# Patient Record
Sex: FEMALE | Race: WHITE | Employment: UNEMPLOYED | URBAN - METROPOLITAN AREA
[De-identification: names, ages, dates, MRNs, and addresses within clinical notes are randomized per-mention and may not be internally consistent; named-entity substitution may affect disease eponyms.]

---
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
## 2023-02-21 ENCOUNTER — OFFICE VISIT (OUTPATIENT)
Dept: FAMILY MEDICINE CLINIC | Facility: CLINIC | Age: 18
End: 2023-02-21
Payer: COMMERCIAL

## 2023-02-21 VITALS
HEIGHT: 64 IN | BODY MASS INDEX: 21.92 KG/M2 | SYSTOLIC BLOOD PRESSURE: 98 MMHG | WEIGHT: 128.4 LBS | DIASTOLIC BLOOD PRESSURE: 68 MMHG

## 2023-02-21 DIAGNOSIS — J01.90 ACUTE SINUSITIS, RECURRENCE NOT SPECIFIED, UNSPECIFIED LOCATION: ICD-10-CM

## 2023-02-21 DIAGNOSIS — J30.9 ALLERGIC RHINITIS, UNSPECIFIED SEASONALITY, UNSPECIFIED TRIGGER: ICD-10-CM

## 2023-02-21 DIAGNOSIS — R51.9 FACIAL PAIN: ICD-10-CM

## 2023-02-21 DIAGNOSIS — R09.81 HEAD CONGESTION: Primary | ICD-10-CM

## 2023-02-21 PROCEDURE — 99213 OFFICE O/P EST LOW 20 MIN: CPT | Performed by: FAMILY MEDICINE

## 2023-02-21 RX ORDER — FLUOXETINE HYDROCHLORIDE 20 MG/1
CAPSULE ORAL
COMMUNITY
Start: 2023-01-27

## 2023-02-21 RX ORDER — FLUOXETINE 10 MG/1
CAPSULE ORAL
COMMUNITY
Start: 2023-01-27

## 2023-02-21 RX ORDER — AZITHROMYCIN 250 MG/1
TABLET, FILM COATED ORAL
Qty: 6 TABLET | Refills: 0 | Status: SHIPPED | OUTPATIENT
Start: 2023-02-21

## 2023-02-21 ASSESSMENT — PATIENT HEALTH QUESTIONNAIRE - PHQ9
1. LITTLE INTEREST OR PLEASURE IN DOING THINGS: 0
SUM OF ALL RESPONSES TO PHQ QUESTIONS 1-9: 0
2. FEELING DOWN, DEPRESSED OR HOPELESS: 0
SUM OF ALL RESPONSES TO PHQ9 QUESTIONS 1 & 2: 0

## 2023-02-21 NOTE — PROGRESS NOTES
SUBJECTIVE:   Annamarie Crouch is a 21 y.o. female who has a PMH sig for anxious depression followed by psychiatry, allergic rhinitis, tachycardia with negative pediatric cardiological work-up and scoliosis. Patient presents today reporting a roughly 2-week history of worsening facial pain, yellow-green nasal drainage, headache and congestion in her head. Allergies have been flared up for about 3 weeks. Over-the-counter meds have been of little benefit. No fever. Negative COVID test at home. HPI  See above    Past Medical History, Past Surgical History, Family history, Social History, and Medications were all reviewed with the patient today and updated as necessary. Current Outpatient Medications   Medication Sig Dispense Refill    FLUoxetine (PROZAC) 20 MG capsule TAKE 1 CAPSULE BY MOUTH DAILY. TAKE WITH FLUOXETINE 10MG CAPSULE FOR A TOTAL OF 30MG DAILY      FLUoxetine (PROZAC) 10 MG capsule TAKE 1 CAPSULE BY MOUTH DAILY. TAKE WITH FLUOXETINE 20MG CAPSULE FOR A TOTAL OF 30MG DAILY      buPROPion (WELLBUTRIN XL) 150 MG extended release tablet Take 150 mg by mouth daily      norethindrone-ethinyl estradiol (JUNEL FE 1/20) 1-20 MG-MCG per tablet Take 1 tablet by mouth daily      albuterol sulfate  (90 Base) MCG/ACT inhaler Inhale into the lungs (Patient not taking: Reported on 2/21/2023)      doxycycline monohydrate (MONODOX) 100 MG capsule Take 100 mg by mouth 2 times daily (Patient not taking: Reported on 2/21/2023)      fexofenadine (ALLEGRA) 180 MG tablet 1 tablet (Patient not taking: Reported on 2/21/2023)       No current facility-administered medications for this visit. Allergies   Allergen Reactions    Cefdinir Hives and Rash     There is no problem list on file for this patient. Past Medical History:   Diagnosis Date    Asthma     Psychiatric disorder      No past surgical history on file.   Family History   Problem Relation Age of Onset    Heart Attack Paternal Grandfather Anxiety Disorder Paternal Grandfather     Heart Attack Maternal Grandfather     Diabetes Maternal Grandfather     Hypertension Paternal Grandmother     Thyroid Disease Paternal Grandmother     Depression Paternal Grandmother     Anxiety Disorder Paternal Grandmother     Hypertension Father     Diabetes Father     Thyroid Disease Paternal Grandfather     Hypertension Paternal Grandfather     Depression Maternal Grandmother     Anxiety Disorder Maternal Grandmother     Anxiety Disorder Father      Social History     Tobacco Use    Smoking status: Never    Smokeless tobacco: Never   Substance Use Topics    Alcohol use: No         Review of Systems  See above    OBJECTIVE:  BP 98/68   Ht 5' 4\" (1.626 m)   Wt 128 lb 6.4 oz (58.2 kg)   BMI 22.04 kg/m²      Physical Exam  Constitutional:       General: She is not in acute distress. Appearance: Normal appearance. She is not ill-appearing or toxic-appearing. HENT:      Head: Normocephalic and atraumatic. Comments: Patient has frontal and maxillary sinus tenderness bilaterally. Ears:      Comments: TM's are clear bilaterally with dull effusions. Nose: Congestion and rhinorrhea present. Mouth/Throat:      Mouth: Mucous membranes are moist.      Pharynx: Oropharynx is clear. No oropharyngeal exudate or posterior oropharyngeal erythema. Cardiovascular:      Rate and Rhythm: Normal rate and regular rhythm. Heart sounds: No murmur heard. Pulmonary:      Effort: Pulmonary effort is normal.      Breath sounds: Normal breath sounds. No wheezing or rhonchi. Musculoskeletal:      Cervical back: Normal range of motion and neck supple. No rigidity or tenderness. Lymphadenopathy:      Cervical: No cervical adenopathy. Skin:     Findings: No rash. Neurological:      Mental Status: She is alert and oriented to person, place, and time.    Psychiatric:         Mood and Affect: Mood normal.         Behavior: Behavior normal.       Medical problems and test results were reviewed with the patient today. ASSESSMENT and PLAN    1. Head congestion. Over-the-counter plain Mucinex and analgesics as needed. 2.  Facial pain. As above. 3.  Allergic rhinitis. 1 cc of Kenalog IM. 4.  Sinusitis. Z-Sidney as directed. Return if symptoms persist or worsen. Elements of this note have been dictated using speech recognition software. As a result, errors of speech recognition may have occurred.

## 2023-05-29 SDOH — ECONOMIC STABILITY: TRANSPORTATION INSECURITY
IN THE PAST 12 MONTHS, HAS LACK OF TRANSPORTATION KEPT YOU FROM MEETINGS, WORK, OR FROM GETTING THINGS NEEDED FOR DAILY LIVING?: PATIENT DECLINED

## 2023-05-29 SDOH — ECONOMIC STABILITY: INCOME INSECURITY: HOW HARD IS IT FOR YOU TO PAY FOR THE VERY BASICS LIKE FOOD, HOUSING, MEDICAL CARE, AND HEATING?: PATIENT DECLINED

## 2023-05-29 SDOH — ECONOMIC STABILITY: FOOD INSECURITY: WITHIN THE PAST 12 MONTHS, THE FOOD YOU BOUGHT JUST DIDN'T LAST AND YOU DIDN'T HAVE MONEY TO GET MORE.: PATIENT DECLINED

## 2023-05-29 SDOH — ECONOMIC STABILITY: FOOD INSECURITY: WITHIN THE PAST 12 MONTHS, YOU WORRIED THAT YOUR FOOD WOULD RUN OUT BEFORE YOU GOT MONEY TO BUY MORE.: PATIENT DECLINED

## 2023-05-29 SDOH — ECONOMIC STABILITY: HOUSING INSECURITY
IN THE LAST 12 MONTHS, WAS THERE A TIME WHEN YOU DID NOT HAVE A STEADY PLACE TO SLEEP OR SLEPT IN A SHELTER (INCLUDING NOW)?: PATIENT REFUSED

## 2023-05-29 ASSESSMENT — PATIENT HEALTH QUESTIONNAIRE - PHQ9
1. LITTLE INTEREST OR PLEASURE IN DOING THINGS: 1
SUM OF ALL RESPONSES TO PHQ QUESTIONS 1-9: 2
SUM OF ALL RESPONSES TO PHQ QUESTIONS 1-9: 2
SUM OF ALL RESPONSES TO PHQ9 QUESTIONS 1 & 2: 2
2. FEELING DOWN, DEPRESSED OR HOPELESS: 1
1. LITTLE INTEREST OR PLEASURE IN DOING THINGS: SEVERAL DAYS
2. FEELING DOWN, DEPRESSED OR HOPELESS: SEVERAL DAYS
SUM OF ALL RESPONSES TO PHQ QUESTIONS 1-9: 2
SUM OF ALL RESPONSES TO PHQ QUESTIONS 1-9: 2
SUM OF ALL RESPONSES TO PHQ9 QUESTIONS 1 & 2: 2

## 2023-06-01 ENCOUNTER — OFFICE VISIT (OUTPATIENT)
Dept: FAMILY MEDICINE CLINIC | Facility: CLINIC | Age: 18
End: 2023-06-01
Payer: COMMERCIAL

## 2023-06-01 VITALS
SYSTOLIC BLOOD PRESSURE: 98 MMHG | WEIGHT: 123 LBS | BODY MASS INDEX: 21 KG/M2 | DIASTOLIC BLOOD PRESSURE: 60 MMHG | HEIGHT: 64 IN

## 2023-06-01 DIAGNOSIS — I95.9 HYPOTENSION, UNSPECIFIED HYPOTENSION TYPE: ICD-10-CM

## 2023-06-01 DIAGNOSIS — F41.9 ANXIETY: ICD-10-CM

## 2023-06-01 DIAGNOSIS — R53.1 WEAKNESS: Primary | ICD-10-CM

## 2023-06-01 DIAGNOSIS — I34.1 MITRAL VALVE PROLAPSE: ICD-10-CM

## 2023-06-01 DIAGNOSIS — R53.83 FATIGUE, UNSPECIFIED TYPE: ICD-10-CM

## 2023-06-01 LAB
25(OH)D3 SERPL-MCNC: 16.8 NG/ML (ref 30–100)
ALBUMIN SERPL-MCNC: 3.7 G/DL (ref 3.5–5)
ALBUMIN/GLOB SERPL: 1 (ref 0.4–1.6)
ALP SERPL-CCNC: 60 U/L (ref 50–136)
ALT SERPL-CCNC: 22 U/L (ref 12–65)
ANION GAP SERPL CALC-SCNC: 7 MMOL/L (ref 2–11)
AST SERPL-CCNC: 15 U/L (ref 15–37)
BASOPHILS # BLD: 0.1 K/UL (ref 0–0.2)
BASOPHILS NFR BLD: 1 % (ref 0–2)
BILIRUB SERPL-MCNC: 0.2 MG/DL (ref 0.2–1.1)
BUN SERPL-MCNC: 6 MG/DL (ref 6–23)
CALCIUM SERPL-MCNC: 9.1 MG/DL (ref 8.3–10.4)
CHLORIDE SERPL-SCNC: 106 MMOL/L (ref 101–110)
CO2 SERPL-SCNC: 26 MMOL/L (ref 21–32)
CREAT SERPL-MCNC: 0.8 MG/DL (ref 0.6–1)
DIFFERENTIAL METHOD BLD: ABNORMAL
EOSINOPHIL # BLD: 0.4 K/UL (ref 0–0.8)
EOSINOPHIL NFR BLD: 6 % (ref 0.5–7.8)
ERYTHROCYTE [DISTWIDTH] IN BLOOD BY AUTOMATED COUNT: 12.2 % (ref 11.9–14.6)
GLOBULIN SER CALC-MCNC: 3.6 G/DL (ref 2.8–4.5)
GLUCOSE SERPL-MCNC: 88 MG/DL (ref 65–100)
HCT VFR BLD AUTO: 41 % (ref 35.8–46.3)
HGB BLD-MCNC: 13.4 G/DL (ref 11.7–15.4)
IMM GRANULOCYTES # BLD AUTO: 0 K/UL (ref 0–0.5)
IMM GRANULOCYTES NFR BLD AUTO: 0 % (ref 0–5)
LYMPHOCYTES # BLD: 2.7 K/UL (ref 0.5–4.6)
LYMPHOCYTES NFR BLD: 43 % (ref 13–44)
MCH RBC QN AUTO: 32.4 PG (ref 26.1–32.9)
MCHC RBC AUTO-ENTMCNC: 32.7 G/DL (ref 31.4–35)
MCV RBC AUTO: 99 FL (ref 82–102)
MONOCYTES # BLD: 0.3 K/UL (ref 0.1–1.3)
MONOCYTES NFR BLD: 4 % (ref 4–12)
NEUTS SEG # BLD: 2.8 K/UL (ref 1.7–8.2)
NEUTS SEG NFR BLD: 46 % (ref 43–78)
NRBC # BLD: 0 K/UL (ref 0–0.2)
PLATELET # BLD AUTO: 390 K/UL (ref 150–450)
PMV BLD AUTO: 9.1 FL (ref 9.4–12.3)
POTASSIUM SERPL-SCNC: 3.7 MMOL/L (ref 3.5–5.1)
PROT SERPL-MCNC: 7.3 G/DL (ref 6.3–8.2)
RBC # BLD AUTO: 4.14 M/UL (ref 4.05–5.2)
SODIUM SERPL-SCNC: 139 MMOL/L (ref 133–143)
TSH, 3RD GENERATION: 1.53 UIU/ML (ref 0.36–3.74)
WBC # BLD AUTO: 6.3 K/UL (ref 4.3–11.1)

## 2023-06-01 PROCEDURE — 99214 OFFICE O/P EST MOD 30 MIN: CPT | Performed by: FAMILY MEDICINE

## 2023-06-01 RX ORDER — MODAFINIL 100 MG/1
100 TABLET ORAL EVERY MORNING
COMMUNITY
Start: 2023-05-01

## 2023-06-01 NOTE — PROGRESS NOTES
were reviewed with the patient today. ASSESSMENT and PLAN    1. Weakness. Appears to be orthostasis and relative hypotension. Probably connected to poor nutrition. Check vitamin D, TSH, CBC and metabolic panel. Patient instructed to drink Gatorade twice a day and to increase caloric intake including protein. She agrees and will work on this. Change positions slowly to avoid orthostasis. 2.  Fatigue. As above. 3.  History of mitral valve prolapse. Check echo for surveillance purposes. 4.  Hypotension. As above. 5.  Anxiety. Continue follow-up with psychiatry. Reports doing well. No changes in medication. Elements of this note have been dictated using speech recognition software. As a result, errors of speech recognition may have occurred.

## 2023-06-02 ENCOUNTER — TELEPHONE (OUTPATIENT)
Dept: FAMILY MEDICINE CLINIC | Facility: CLINIC | Age: 18
End: 2023-06-02

## 2023-06-02 RX ORDER — ERGOCALCIFEROL 1.25 MG/1
50000 CAPSULE ORAL WEEKLY
Qty: 12 CAPSULE | Refills: 1 | Status: SHIPPED | OUTPATIENT
Start: 2023-06-02

## 2023-06-02 NOTE — TELEPHONE ENCOUNTER
----- Message from Rajinder Canales MD sent at 6/2/2023  1:31 PM EDT -----  Please let patient know that her vitamin D level was low. I would like to call her in a prescription vitamin D to take once weekly. We will then recheck vitamin D level in about 6 to 8 weeks.   ----- Message -----  From: Jhony Garnett [employer]  Sent: 6/1/2023   6:19 PM EDT  To: Rajinder Canales MD

## 2023-06-02 NOTE — TELEPHONE ENCOUNTER
Pt father was called and informed of pt's low vitamin d level. Informed that Dr Syed Rosales has prescribed a supplement for pt to take once a week. We will recheck levels at pt's appt on 7/13.

## 2023-07-09 ASSESSMENT — PATIENT HEALTH QUESTIONNAIRE - PHQ9
SUM OF ALL RESPONSES TO PHQ9 QUESTIONS 1 & 2: 2
1. LITTLE INTEREST OR PLEASURE IN DOING THINGS: SEVERAL DAYS
2. FEELING DOWN, DEPRESSED OR HOPELESS: 1
1. LITTLE INTEREST OR PLEASURE IN DOING THINGS: 1
SUM OF ALL RESPONSES TO PHQ QUESTIONS 1-9: 2
SUM OF ALL RESPONSES TO PHQ9 QUESTIONS 1 & 2: 2
SUM OF ALL RESPONSES TO PHQ QUESTIONS 1-9: 2
2. FEELING DOWN, DEPRESSED OR HOPELESS: SEVERAL DAYS

## 2023-07-13 ENCOUNTER — OFFICE VISIT (OUTPATIENT)
Dept: FAMILY MEDICINE CLINIC | Facility: CLINIC | Age: 18
End: 2023-07-13
Payer: COMMERCIAL

## 2023-07-13 VITALS
BODY MASS INDEX: 21.58 KG/M2 | WEIGHT: 126.4 LBS | HEART RATE: 102 BPM | OXYGEN SATURATION: 98 % | HEIGHT: 64 IN | SYSTOLIC BLOOD PRESSURE: 98 MMHG | DIASTOLIC BLOOD PRESSURE: 66 MMHG

## 2023-07-13 DIAGNOSIS — I34.0 NONRHEUMATIC MITRAL VALVE REGURGITATION: ICD-10-CM

## 2023-07-13 DIAGNOSIS — R53.82 CHRONIC FATIGUE: Primary | ICD-10-CM

## 2023-07-13 DIAGNOSIS — I95.9 HYPOTENSION, UNSPECIFIED HYPOTENSION TYPE: ICD-10-CM

## 2023-07-13 DIAGNOSIS — E55.9 VITAMIN D DEFICIENCY: ICD-10-CM

## 2023-07-13 DIAGNOSIS — R53.1 WEAKNESS: ICD-10-CM

## 2023-07-13 DIAGNOSIS — I34.1 MVP (MITRAL VALVE PROLAPSE): ICD-10-CM

## 2023-07-13 DIAGNOSIS — F41.9 ANXIETY: ICD-10-CM

## 2023-07-13 LAB — 25(OH)D3 SERPL-MCNC: 67 NG/ML (ref 30–100)

## 2023-07-13 PROCEDURE — 99214 OFFICE O/P EST MOD 30 MIN: CPT | Performed by: FAMILY MEDICINE

## 2023-07-13 RX ORDER — ALBUTEROL SULFATE 90 UG/1
2 AEROSOL, METERED RESPIRATORY (INHALATION) EVERY 6 HOURS PRN
COMMUNITY

## 2023-07-13 RX ORDER — LANOLIN ALCOHOL/MO/W.PET/CERES
1000 CREAM (GRAM) TOPICAL DAILY
COMMUNITY

## 2023-07-13 NOTE — PROGRESS NOTES
SUBJECTIVE:   Frankey Dellen is a 21 y.o. female who has a [facility] sig for anxious depression followed by psychiatry, allergic rhinitis, tachycardia with negative pediatric cardiological work-up and scoliosis. Patient presents today accompanied by her father. Patient reported at the last visit that for at least a month she has been experiencing intermittent weakness and lightheadedness. This seems to be more prominent with abrupt changes in position. Patient was instructed to drink Gatorade twice a day and salt her food. She was also instructed to increase protein intake. Lab work was ordered including a metabolic panel, TSH, CBC and vitamin D level. Labs were unremarkable except for vitamin D level was low at 16.8. With patient's history of mitral valve prolapse and no follow-up since she was age 13 echo was ordered. This did reveal mitral valve prolapse with mild to moderate mitral regurgitation. No other abnormalities were noted. She is reporting no chest pain or shortness of breath. HPI  See above    Past Medical History, Past Surgical History, Family history, Social History, and Medications were all reviewed with the patient today and updated as necessary. Current Outpatient Medications   Medication Sig Dispense Refill    vitamin B-12 (CYANOCOBALAMIN) 1000 MCG tablet Take 1 tablet by mouth daily      albuterol sulfate HFA (VENTOLIN HFA) 108 (90 Base) MCG/ACT inhaler Inhale 2 puffs into the lungs every 6 hours as needed for Wheezing      vitamin D (ERGOCALCIFEROL) 1.25 MG (74889 UT) CAPS capsule Take 1 capsule by mouth once a week 12 capsule 1    FLUoxetine (PROZAC) 20 MG capsule TAKE 1 CAPSULE BY MOUTH DAILY. TAKE WITH FLUOXETINE 10MG CAPSULE FOR A TOTAL OF 30MG DAILY      FLUoxetine (PROZAC) 10 MG capsule TAKE 1 CAPSULE BY MOUTH DAILY.  TAKE WITH FLUOXETINE 20MG CAPSULE FOR A TOTAL OF 30MG DAILY      buPROPion (WELLBUTRIN XL) 150 MG extended release tablet Take 1 tablet by mouth daily

## 2023-08-25 ENCOUNTER — INITIAL CONSULT (OUTPATIENT)
Age: 18
End: 2023-08-25
Payer: COMMERCIAL

## 2023-08-25 VITALS
DIASTOLIC BLOOD PRESSURE: 64 MMHG | SYSTOLIC BLOOD PRESSURE: 116 MMHG | BODY MASS INDEX: 22.2 KG/M2 | WEIGHT: 130 LBS | HEIGHT: 64 IN | HEART RATE: 80 BPM

## 2023-08-25 DIAGNOSIS — G90.A POTS (POSTURAL ORTHOSTATIC TACHYCARDIA SYNDROME): ICD-10-CM

## 2023-08-25 DIAGNOSIS — I34.1 MVP (MITRAL VALVE PROLAPSE): Primary | ICD-10-CM

## 2023-08-25 PROCEDURE — 93000 ELECTROCARDIOGRAM COMPLETE: CPT | Performed by: INTERNAL MEDICINE

## 2023-08-25 PROCEDURE — 99204 OFFICE O/P NEW MOD 45 MIN: CPT | Performed by: INTERNAL MEDICINE

## 2023-08-25 RX ORDER — FLUDROCORTISONE ACETATE 0.1 MG/1
0.1 TABLET ORAL DAILY
Qty: 30 TABLET | Refills: 0 | Status: SHIPPED | OUTPATIENT
Start: 2023-08-25 | End: 2023-09-24

## 2023-08-25 NOTE — PROGRESS NOTES
[id number] [place] [address] [facility]  PHONE: [phone]      23    NAME:  Russel Caro  : 2005  MRN: [id number]         SUBJECTIVE:   Russel Caro is a 21 y.o. female seen for a consultation visit regarding the following:     Chief Complaint   Patient presents with    Valvular Heart Disease    Consultation            HPI:  Consultation is requested by Ignacio Elizalde MD for evaluation of Valvular Heart Disease and Consultation   . Ms. Rylee Albrecht presents today for follow-up. Patient was referred for evaluation of an abnormal echocardiogram.  Patient's had a history of mitral valve prolapse, is evaluated by  cardiologist 8 to 10 years ago. She is complained of some dizziness upon standing as well as prolonged standing. Heat exposure is also a problem. She had 1 episode of syncope several years ago after hot shower. She has not had any more recently. Says her episodes of dizziness last for several minutes and improve with sitting and resting. She denies chest pain, shortness of breath, orthopnea, PND, palpitations, syncope or lower extremity swelling. She underwent recent echocardiogram which showed mitral valve prolapse with mild to moderate mitral regurgitation. Key CAD CHF Meds       Patient is on no cardiovascular meds. Key Antihyperglycemic Medications       Patient is on no antihyperglycemic meds. Past Medical History, Past Surgical History, Family history, Social History, and Medications were all reviewed with the patient today and updated as necessary. Prior to Admission medications    Medication Sig Start Date End Date Taking?  Authorizing Provider   fludrocortisone (FLORINEF) 0.1 MG tablet Take 1 tablet by mouth daily 23 Yes Kelly Adames III, MD   vitamin B-12 (CYANOCOBALAMIN) 1000 MCG tablet Take 1 tablet by mouth daily   Yes Historical Provider, MD   albuterol sulfate HFA

## 2023-11-07 RX ORDER — ERGOCALCIFEROL 1.25 MG/1
50000 CAPSULE ORAL WEEKLY
Qty: 12 CAPSULE | Refills: 1 | Status: SHIPPED | OUTPATIENT
Start: 2023-11-07

## 2023-11-07 RX ORDER — ERGOCALCIFEROL 1.25 MG/1
50000 CAPSULE ORAL WEEKLY
Qty: 12 CAPSULE | Refills: 1 | OUTPATIENT
Start: 2023-11-07

## 2023-11-08 ENCOUNTER — OFFICE VISIT (OUTPATIENT)
Dept: FAMILY MEDICINE CLINIC | Facility: CLINIC | Age: 18
End: 2023-11-08
Payer: COMMERCIAL

## 2023-11-08 VITALS
WEIGHT: 133 LBS | HEART RATE: 108 BPM | SYSTOLIC BLOOD PRESSURE: 116 MMHG | BODY MASS INDEX: 22.83 KG/M2 | OXYGEN SATURATION: 99 % | TEMPERATURE: 99.5 F | DIASTOLIC BLOOD PRESSURE: 70 MMHG

## 2023-11-08 DIAGNOSIS — R05.1 ACUTE COUGH: ICD-10-CM

## 2023-11-08 DIAGNOSIS — R09.89 CHEST CONGESTION: Primary | ICD-10-CM

## 2023-11-08 DIAGNOSIS — J45.21 MILD INTERMITTENT ASTHMATIC BRONCHITIS WITH ACUTE EXACERBATION: ICD-10-CM

## 2023-11-08 PROCEDURE — 99213 OFFICE O/P EST LOW 20 MIN: CPT | Performed by: FAMILY MEDICINE

## 2023-11-08 PROCEDURE — 96372 THER/PROPH/DIAG INJ SC/IM: CPT | Performed by: FAMILY MEDICINE

## 2023-11-08 RX ORDER — BENZONATATE 200 MG/1
200 CAPSULE ORAL 3 TIMES DAILY PRN
Qty: 30 CAPSULE | Refills: 0 | Status: SHIPPED | OUTPATIENT
Start: 2023-11-08

## 2023-11-08 RX ORDER — TRIAMCINOLONE ACETONIDE 40 MG/ML
40 INJECTION, SUSPENSION INTRA-ARTICULAR; INTRAMUSCULAR ONCE
Status: COMPLETED | OUTPATIENT
Start: 2023-11-08 | End: 2023-11-08

## 2023-11-08 RX ORDER — FLUOXETINE HYDROCHLORIDE 40 MG/1
CAPSULE ORAL
COMMUNITY
Start: 2023-10-11

## 2023-11-08 RX ORDER — DOXYCYCLINE 100 MG/1
100 TABLET ORAL 2 TIMES DAILY
Qty: 14 TABLET | Refills: 0 | Status: SHIPPED | OUTPATIENT
Start: 2023-11-08 | End: 2023-11-15

## 2023-11-08 RX ADMIN — TRIAMCINOLONE ACETONIDE 40 MG: 40 INJECTION, SUSPENSION INTRA-ARTICULAR; INTRAMUSCULAR at 17:13

## 2023-11-09 NOTE — PROGRESS NOTES
SUBJECTIVE:   Romulo Levine is a 21 y.o. female presents today accompanied by her father reporting that she has been experiencing chest congestion, wheezing and a productive cough for about 2 weeks. She is feeling better but her symptoms have persisted. No chest pain or shortness of breath. No body aches or fever. HPI  See above    Past Medical History, Past Surgical History, Family history, Social History, and Medications were all reviewed with the patient today and updated as necessary. Current Outpatient Medications   Medication Sig Dispense Refill    FLUoxetine (PROZAC) 40 MG capsule TAKE 1 CAPSULE BY MOUTH DAILY IN THE MORNING      doxycycline monohydrate (ADOXA) 100 MG tablet Take 1 tablet by mouth 2 times daily for 7 days 14 tablet 0    benzonatate (TESSALON) 200 MG capsule Take 1 capsule by mouth 3 times daily as needed for Cough 30 capsule 0    vitamin D (ERGOCALCIFEROL) 1.25 MG (54243 UT) CAPS capsule Take 1 capsule by mouth once a week 12 capsule 1    vitamin B-12 (CYANOCOBALAMIN) 1000 MCG tablet Take 1 tablet by mouth daily      albuterol sulfate HFA (PROVENTIL;VENTOLIN;PROAIR) 108 (90 Base) MCG/ACT inhaler Inhale 2 puffs into the lungs every 6 hours as needed for Wheezing      buPROPion (WELLBUTRIN XL) 150 MG extended release tablet Take 1 tablet by mouth daily      norethindrone-ethinyl estradiol (JUNEL FE 1/20) 1-20 MG-MCG per tablet Take 1 tablet by mouth daily       No current facility-administered medications for this visit. Allergies   Allergen Reactions    Latex Other (See Comments)    Gramineae Pollens Hives    Molds & Smuts Other (See Comments)    Cefdinir Hives and Rash     There is no problem list on file for this patient. Past Medical History:   Diagnosis Date    Asthma     Psychiatric disorder      No past surgical history on file.   Family History   Problem Relation Age of Onset    Heart Attack Paternal Grandfather     Anxiety Disorder Paternal Grandfather     Heart

## 2023-12-26 ENCOUNTER — TELEPHONE (OUTPATIENT)
Dept: FAMILY MEDICINE CLINIC | Facility: CLINIC | Age: 18
End: 2023-12-26

## 2023-12-26 RX ORDER — OSELTAMIVIR PHOSPHATE 75 MG/1
75 CAPSULE ORAL DAILY
Qty: 10 CAPSULE | Refills: 0 | Status: SHIPPED | OUTPATIENT
Start: 2023-12-26 | End: 2024-01-05

## 2023-12-26 NOTE — TELEPHONE ENCOUNTER
Pt's brother and father had flu. Pt is asymptomatic at this time along with her mother. Pt's father is being seen today for telemedicine for treatment. Request was made for an antiviral course for prevention.

## 2024-01-04 ENCOUNTER — OFFICE VISIT (OUTPATIENT)
Age: 19
End: 2024-01-04
Payer: COMMERCIAL

## 2024-01-04 VITALS
BODY MASS INDEX: 21.31 KG/M2 | WEIGHT: 127.9 LBS | HEIGHT: 65 IN | DIASTOLIC BLOOD PRESSURE: 78 MMHG | SYSTOLIC BLOOD PRESSURE: 120 MMHG | HEART RATE: 96 BPM

## 2024-01-04 DIAGNOSIS — G90.A POTS (POSTURAL ORTHOSTATIC TACHYCARDIA SYNDROME): ICD-10-CM

## 2024-01-04 PROCEDURE — 99214 OFFICE O/P EST MOD 30 MIN: CPT | Performed by: INTERNAL MEDICINE

## 2024-01-04 RX ORDER — FLUDROCORTISONE ACETATE 0.1 MG/1
0.1 TABLET ORAL DAILY
Qty: 90 TABLET | Refills: 3 | Status: SHIPPED | OUTPATIENT
Start: 2024-01-04 | End: 2024-12-29

## 2024-01-04 RX ORDER — METOPROLOL SUCCINATE 25 MG/1
25 TABLET, EXTENDED RELEASE ORAL DAILY
Qty: 30 TABLET | Refills: 5 | Status: SHIPPED | OUTPATIENT
Start: 2024-01-04

## 2024-01-10 ENCOUNTER — NURSE ONLY (OUTPATIENT)
Dept: FAMILY MEDICINE CLINIC | Facility: CLINIC | Age: 19
End: 2024-01-10
Payer: COMMERCIAL

## 2024-01-10 DIAGNOSIS — E55.9 VITAMIN D DEFICIENCY: ICD-10-CM

## 2024-01-10 LAB — 25(OH)D3 SERPL-MCNC: 60.1 NG/ML (ref 30–100)

## 2024-01-10 PROCEDURE — 36415 COLL VENOUS BLD VENIPUNCTURE: CPT | Performed by: FAMILY MEDICINE

## 2024-01-15 NOTE — PROGRESS NOTES
[facility] CARDIOLOG[facility]  [address] [place] [address], [address]  [place] [address]  PHONE: [phone]      01/15/24    NAME:  Ruth Grissom  : 2005  MRN: [id number]         SUBJECTIVE:   Ruth Grissom is a 18 y.o. female seen for a follow up visit regarding the following:     Chief Complaint   Patient presents with    Other     MVP            HPI:  Follow up  Other (MVP)   .    Ms. Mcintosh presents today for follow-up.  She reports continued issues with palpitations and heart racing.  Says some dizzy spells as well although she notes she recently had the flu.  She is only recently gotten over this and this is somewhat worsened her symptoms.  Chief complaint now seems to be more tachycardia driven.  She has had some dizziness but thinks it might have been slightly improved with the Florinef before she got ill.            Key CAD CHF Meds            metoprolol succinate (TOPROL XL) 25 MG extended release tablet (Taking)    Sig - Route: Take 1 tablet by mouth daily - Oral          Key Antihyperglycemic Medications       Patient is on no antihyperglycemic meds.                Past Medical History, Past Surgical History, Family history, Social History, and Medications were all reviewed with the patient today and updated as necessary.     Prior to Admission medications    Medication Sig Start Date End Date Taking? Authorizing Provider   fludrocortisone (FLORINEF) 0.1 MG tablet Take 1 tablet by mouth daily 24 Yes Damir Nguyen III, MD   metoprolol succinate (TOPROL XL) 25 MG extended release tablet Take 1 tablet by mouth daily 24  Yes Damir Nguyen III, MD   FLUoxetine (PROZAC) 40 MG capsule Take 50 mg by mouth daily 10/11/23  Yes ProviderJosse MD   benzonatate (TESSALON) 200 MG capsule Take 1 capsule by mouth 3 times daily as needed for Cough 23  Yes Alfredo Jade MD   vitamin D (ERGOCALCIFEROL) 1.25 MG (88382 UT) CAPS capsule Take 1 capsule by mouth once a week

## 2024-01-17 ENCOUNTER — OFFICE VISIT (OUTPATIENT)
Dept: FAMILY MEDICINE CLINIC | Facility: CLINIC | Age: 19
End: 2024-01-17
Payer: COMMERCIAL

## 2024-01-17 VITALS
SYSTOLIC BLOOD PRESSURE: 98 MMHG | HEIGHT: 65 IN | WEIGHT: 129.6 LBS | BODY MASS INDEX: 21.59 KG/M2 | TEMPERATURE: 98.4 F | DIASTOLIC BLOOD PRESSURE: 66 MMHG | OXYGEN SATURATION: 99 % | HEART RATE: 92 BPM

## 2024-01-17 DIAGNOSIS — L03.032 PARONYCHIA OF GREAT TOE OF LEFT FOOT: ICD-10-CM

## 2024-01-17 DIAGNOSIS — G90.A POTS (POSTURAL ORTHOSTATIC TACHYCARDIA SYNDROME): ICD-10-CM

## 2024-01-17 DIAGNOSIS — E55.9 VITAMIN D DEFICIENCY: ICD-10-CM

## 2024-01-17 DIAGNOSIS — H69.83 EUSTACHIAN TUBE DYSFUNCTION, BILATERAL: ICD-10-CM

## 2024-01-17 DIAGNOSIS — R09.82 POSTNASAL DRIP: Primary | ICD-10-CM

## 2024-01-17 PROCEDURE — 99214 OFFICE O/P EST MOD 30 MIN: CPT | Performed by: FAMILY MEDICINE

## 2024-01-17 RX ORDER — FLUOXETINE 10 MG/1
CAPSULE ORAL
COMMUNITY
Start: 2024-01-04

## 2024-01-17 NOTE — PROGRESS NOTES
SUBJECTIVE:   Ruth Grissom is a 18 y.o. female who has a [facility]H sig for anxious depression followed by psychiatry, allergic rhinitis, vitamin D deficiency, POTS syndrome followed by cardiology and scoliosis.   Patient presents today accompanied by mother reporting that she has been experiencing fullness in her throat, headache and bilateral ear pressure and fullness.  Symptoms present for about a week.  No fever, cough, body aches, chest pain or shortness of breath.    In addition the patient reports that she has a infected left great toenail.  She states that the toenail on the cuticle has been red and painful to touch for about 5 days.  No fever associated with these complaints.    Patient has been taking vitamin D 50,000 units weekly for several months.  Please refer to prior notes for details.    HPI  See above    Past Medical History, Past Surgical History, Family history, Social History, and Medications were all reviewed with the patient today and updated as necessary.       Current Outpatient Medications   Medication Sig Dispense Refill    FLUoxetine (PROZAC) 10 MG capsule TAKE 1 CAPSULE BY MOUTH WITH ONE 40MG CAPSULE FOR A TOTAL OF 50MG DAILY      fludrocortisone (FLORINEF) 0.1 MG tablet Take 1 tablet by mouth daily 90 tablet 3    FLUoxetine (PROZAC) 40 MG capsule Take 50 mg by mouth daily      buPROPion (WELLBUTRIN XL) 150 MG extended release tablet Take 1 tablet by mouth daily      metoprolol succinate (TOPROL XL) 25 MG extended release tablet Take 1 tablet by mouth daily (Patient taking differently: Take 1 tablet by mouth daily Has not started) 30 tablet 5    benzonatate (TESSALON) 200 MG capsule Take 1 capsule by mouth 3 times daily as needed for Cough 30 capsule 0    vitamin D (ERGOCALCIFEROL) 1.25 MG (92007 UT) CAPS capsule Take 1 capsule by mouth once a week 12 capsule 1    vitamin B-12 (CYANOCOBALAMIN) 1000 MCG tablet Take 1 tablet by mouth daily      albuterol sulfate HFA (PROVENTIL;VENTOLIN;PROAIR)

## 2024-01-27 DIAGNOSIS — G90.A POTS (POSTURAL ORTHOSTATIC TACHYCARDIA SYNDROME): ICD-10-CM

## 2024-01-28 RX ORDER — FLUDROCORTISONE ACETATE 0.1 MG/1
0.1 TABLET ORAL DAILY
Qty: 90 TABLET | Refills: 3 | Status: SHIPPED | OUTPATIENT
Start: 2024-01-28 | End: 2025-01-22

## 2024-03-27 ENCOUNTER — OFFICE VISIT (OUTPATIENT)
Dept: FAMILY MEDICINE CLINIC | Facility: CLINIC | Age: 19
End: 2024-03-27
Payer: COMMERCIAL

## 2024-03-27 VITALS
OXYGEN SATURATION: 99 % | DIASTOLIC BLOOD PRESSURE: 80 MMHG | HEIGHT: 65 IN | WEIGHT: 128 LBS | SYSTOLIC BLOOD PRESSURE: 118 MMHG | HEART RATE: 97 BPM | BODY MASS INDEX: 21.33 KG/M2

## 2024-03-27 DIAGNOSIS — R09.81 NASAL CONGESTION: ICD-10-CM

## 2024-03-27 DIAGNOSIS — J30.9 ALLERGIC RHINITIS, UNSPECIFIED SEASONALITY, UNSPECIFIED TRIGGER: Primary | ICD-10-CM

## 2024-03-27 DIAGNOSIS — L30.9 DERMATITIS: ICD-10-CM

## 2024-03-27 DIAGNOSIS — R09.82 POST-NASAL DRIP: ICD-10-CM

## 2024-03-27 PROCEDURE — 99213 OFFICE O/P EST LOW 20 MIN: CPT | Performed by: NURSE PRACTITIONER

## 2024-03-27 RX ORDER — SODIUM FLUORIDE 6 MG/ML
PASTE, DENTIFRICE DENTAL
COMMUNITY
Start: 2024-01-10

## 2024-03-27 RX ORDER — PREDNISONE 20 MG/1
20 TABLET ORAL DAILY
Qty: 3 TABLET | Refills: 0 | Status: SHIPPED | OUTPATIENT
Start: 2024-03-27 | End: 2024-03-30

## 2024-03-27 RX ORDER — TRIAMCINOLONE ACETONIDE 1 MG/G
OINTMENT TOPICAL 2 TIMES DAILY
Qty: 15 G | Refills: 0 | Status: SHIPPED | OUTPATIENT
Start: 2024-03-27 | End: 2024-04-03

## 2024-03-27 ASSESSMENT — PATIENT HEALTH QUESTIONNAIRE - PHQ9
SUM OF ALL RESPONSES TO PHQ QUESTIONS 1-9: 0
SUM OF ALL RESPONSES TO PHQ QUESTIONS 1-9: 0
SUM OF ALL RESPONSES TO PHQ9 QUESTIONS 1 & 2: 0
SUM OF ALL RESPONSES TO PHQ QUESTIONS 1-9: 0
SUM OF ALL RESPONSES TO PHQ QUESTIONS 1-9: 0
2. FEELING DOWN, DEPRESSED OR HOPELESS: NOT AT ALL
1. LITTLE INTEREST OR PLEASURE IN DOING THINGS: NOT AT ALL

## 2024-03-27 ASSESSMENT — ENCOUNTER SYMPTOMS
STRIDOR: 0
ANAL BLEEDING: 0
CHOKING: 0
SORE THROAT: 1
CONSTIPATION: 0
SINUS PAIN: 0
CHEST TIGHTNESS: 0
SHORTNESS OF BREATH: 0
VOICE CHANGE: 0
COLOR CHANGE: 0
EYE DISCHARGE: 0
RHINORRHEA: 1
EYES NEGATIVE: 1
DIARRHEA: 0
BLOOD IN STOOL: 0
GASTROINTESTINAL NEGATIVE: 1
ABDOMINAL DISTENTION: 0
WHEEZING: 0
COUGH: 0
FACIAL SWELLING: 0
RESPIRATORY NEGATIVE: 1
NAUSEA: 0
RECTAL PAIN: 0
APNEA: 0
SINUS PRESSURE: 0
ABDOMINAL PAIN: 0
EYE PAIN: 0
VOMITING: 0
TROUBLE SWALLOWING: 0
BACK PAIN: 0

## 2024-03-27 NOTE — PROGRESS NOTES
[address] [place] [address]  Phone: [phone] Fax [phone]  Matias Orellana MS, APRN, FNP-C  3/27/2024  Chief Complaint   Patient presents with    Allergic Rhinitis      See below      Rash     See below      Pt who has a hx of anxiety, depression, Vitamin D deficiency, POTS, allergic rhinitis reports being at [employer] last week and started having a scratchy sore throat with PND and mild nasal congestion with clear rhinorrhea off and on. Pt also reports developing a fine, itchy, maculopapular rash to her chest and bilat legs. Pt denies any known new exposures to any new soaps, detergents, perfumes, foods, medications. Both her PND and rash are improving but mild symptoms remain. Pt denies any associated fever, chills, malaise, BA's, sinus pressure or pain, cough, SOB, wheezing, CP, N/V/D/abd pain. Pt has not been taking her OTC Allegra like she should. Pt's last menstrual period was 03/27/2024.      ASSESSMENT/PLAN:  Below is the assessment and plan developed based on review of pertinent history, physical exam, labs, studies, and medications.    1. Allergic rhinitis, unspecified seasonality, unspecified trigger  2. Post-nasal drip  3. Nasal congestion  Pt who has a hx of anxiety, depression, Vitamin D deficiency, POTS, allergic rhinitis reports being at [employer] last week and started having a scratchy sore throat with PND and mild nasal congestion with clear rhinorrhea off and on. Pt also reports developing a fine, itchy, maculopapular rash to her chest and bilat legs. Pt denies any known new exposures to any new soaps, detergents, perfumes, foods, medications. Both her PND and rash are improving but mild symptoms remain. Pt denies any associated fever, chills, malaise, BA's, sinus pressure or pain, cough, SOB, wheezing, CP, N/V/D/abd pain. Pt has not been taking her OTC Allegra like she should. Discussed with pt. No physical exam findings to suggest

## 2024-04-02 ENCOUNTER — OFFICE VISIT (OUTPATIENT)
Dept: FAMILY MEDICINE CLINIC | Facility: CLINIC | Age: 19
End: 2024-04-02
Payer: COMMERCIAL

## 2024-04-02 VITALS
TEMPERATURE: 98.9 F | BODY MASS INDEX: 20.96 KG/M2 | DIASTOLIC BLOOD PRESSURE: 68 MMHG | SYSTOLIC BLOOD PRESSURE: 100 MMHG | WEIGHT: 125.8 LBS | HEIGHT: 65 IN

## 2024-04-02 DIAGNOSIS — J02.9 PHARYNGITIS, UNSPECIFIED ETIOLOGY: ICD-10-CM

## 2024-04-02 DIAGNOSIS — R21 RASH: ICD-10-CM

## 2024-04-02 DIAGNOSIS — J30.9 ALLERGIC RHINITIS, UNSPECIFIED SEASONALITY, UNSPECIFIED TRIGGER: ICD-10-CM

## 2024-04-02 DIAGNOSIS — R05.1 ACUTE COUGH: Primary | ICD-10-CM

## 2024-04-02 PROCEDURE — 99213 OFFICE O/P EST LOW 20 MIN: CPT | Performed by: FAMILY MEDICINE

## 2024-04-02 RX ORDER — DOXYCYCLINE 100 MG/1
100 TABLET ORAL 2 TIMES DAILY
Qty: 14 TABLET | Refills: 0 | Status: SHIPPED | OUTPATIENT
Start: 2024-04-02 | End: 2024-04-09

## 2024-04-02 NOTE — PROGRESS NOTES
SUBJECTIVE:   Ruth Grissom is a 18 y.o. female who has a PMH sig for anxious depression followed by psychiatry, allergic rhinitis, vitamin D deficiency, POTS syndrome followed by cardiology and scoliosis.  Patient presents today accompanied by her father.  He reports that for 2 weeks she has had a nonproductive cough, moderate to severe sore throat and congestion in her head and chest.  She was seen by the nurse practitioner last week and prescribed some prednisone.  She did not take this.  She has been compliant with her Allegra.  She has not been compliant with her Flonase.  In addition she has had a rash on her thighs and upper chest.  There is been no new soaps or detergents.  There has been possibly a connection with some sunscreen but in reality she has been using the sunscreen on her entire body and is only broken out in those 2  areas.  The rash is actually improving.  It is mildly pruritic.    HPI  See above    Past Medical History, Past Surgical History, Family history, Social History, and Medications were all reviewed with the patient today and updated as necessary.       Current Outpatient Medications   Medication Sig Dispense Refill    doxycycline monohydrate (ADOXA) 100 MG tablet Take 1 tablet by mouth 2 times daily for 7 days 14 tablet 0    SODIUM FLUORIDE 5000 PPM 1.1 % PSTE USE A PEA SIZED AMOUNT AND BRUSH ON THE TEETH AS YOU WOULD NORMAL TOOTHPASTE. EXPECTORATE AND DO NOT RINSE. USE MORNING AND NIGHT      triamcinolone (KENALOG) 0.1 % ointment Apply topically 2 times daily for 7 days 15 g 0    fludrocortisone (FLORINEF) 0.1 MG tablet TAKE 1 TABLET BY MOUTH DAILY 90 tablet 3    FLUoxetine (PROZAC) 10 MG capsule TAKE 1 CAPSULE BY MOUTH WITH ONE 40MG CAPSULE FOR A TOTAL OF 50MG DAILY      FLUoxetine (PROZAC) 40 MG capsule Take 50 mg by mouth daily      vitamin B-12 (CYANOCOBALAMIN) 1000 MCG tablet Take 1 tablet by mouth daily      albuterol sulfate HFA (PROVENTIL;VENTOLIN;PROAIR) 108 (90 Base)

## 2024-11-25 ENCOUNTER — OFFICE VISIT (OUTPATIENT)
Dept: FAMILY MEDICINE CLINIC | Facility: CLINIC | Age: 19
End: 2024-11-25
Payer: COMMERCIAL

## 2024-11-25 VITALS
BODY MASS INDEX: 20.06 KG/M2 | SYSTOLIC BLOOD PRESSURE: 116 MMHG | TEMPERATURE: 98.7 F | RESPIRATION RATE: 17 BRPM | HEIGHT: 65 IN | HEART RATE: 108 BPM | DIASTOLIC BLOOD PRESSURE: 64 MMHG | OXYGEN SATURATION: 98 % | WEIGHT: 120.4 LBS

## 2024-11-25 DIAGNOSIS — R09.82 POST-NASAL DRIP: ICD-10-CM

## 2024-11-25 DIAGNOSIS — G90.A POTS (POSTURAL ORTHOSTATIC TACHYCARDIA SYNDROME): ICD-10-CM

## 2024-11-25 DIAGNOSIS — R53.81 MALAISE: ICD-10-CM

## 2024-11-25 DIAGNOSIS — J34.89 SINUS PRESSURE: ICD-10-CM

## 2024-11-25 DIAGNOSIS — J01.00 ACUTE MAXILLARY SINUSITIS, RECURRENCE NOT SPECIFIED: Primary | ICD-10-CM

## 2024-11-25 DIAGNOSIS — R09.81 NASAL CONGESTION: ICD-10-CM

## 2024-11-25 PROCEDURE — 99213 OFFICE O/P EST LOW 20 MIN: CPT | Performed by: NURSE PRACTITIONER

## 2024-11-25 RX ORDER — FLUTICASONE PROPIONATE 50 MCG
2 SPRAY, SUSPENSION (ML) NASAL DAILY
Qty: 1 EACH | Refills: 0 | Status: SHIPPED | OUTPATIENT
Start: 2024-11-25 | End: 2024-12-09

## 2024-11-25 RX ORDER — DOXYCYCLINE HYCLATE 100 MG
100 TABLET ORAL 2 TIMES DAILY
Qty: 14 TABLET | Refills: 0 | Status: SHIPPED | OUTPATIENT
Start: 2024-11-25 | End: 2024-12-02

## 2024-11-25 ASSESSMENT — ENCOUNTER SYMPTOMS
RECTAL PAIN: 0
APNEA: 0
SHORTNESS OF BREATH: 0
CHEST TIGHTNESS: 0
BLOOD IN STOOL: 0
ABDOMINAL DISTENTION: 0
RHINORRHEA: 1
FACIAL SWELLING: 0
CHOKING: 0
ANAL BLEEDING: 0
CONSTIPATION: 0
STRIDOR: 0
BACK PAIN: 0
SINUS PRESSURE: 1
DIARRHEA: 0
COUGH: 0
COLOR CHANGE: 0
ABDOMINAL PAIN: 0
GASTROINTESTINAL NEGATIVE: 1
EYE DISCHARGE: 0
VOICE CHANGE: 0
SINUS PAIN: 1
SORE THROAT: 1
VOMITING: 0
WHEEZING: 0
RESPIRATORY NEGATIVE: 1
EYES NEGATIVE: 1
EYE PAIN: 0
TROUBLE SWALLOWING: 0
NAUSEA: 0

## 2024-11-25 ASSESSMENT — PATIENT HEALTH QUESTIONNAIRE - PHQ9
SUM OF ALL RESPONSES TO PHQ9 QUESTIONS 1 & 2: 0
SUM OF ALL RESPONSES TO PHQ QUESTIONS 1-9: 0
SUM OF ALL RESPONSES TO PHQ QUESTIONS 1-9: 0
1. LITTLE INTEREST OR PLEASURE IN DOING THINGS: NOT AT ALL
SUM OF ALL RESPONSES TO PHQ QUESTIONS 1-9: 0
SUM OF ALL RESPONSES TO PHQ QUESTIONS 1-9: 0
2. FEELING DOWN, DEPRESSED OR HOPELESS: NOT AT ALL

## 2024-11-25 NOTE — PROGRESS NOTES
[place] [address]  Phone: [phone] Fax [phone]  Matias Orellana MS, APRN, FNP-C  11/25/2024  Chief Complaint   Patient presents with    Sinusitis     See below      Pt who has a hx of anxiety, depression, Vitamin D deficiency, POTS, and allergic rhinitis reports starting 11/19/24 with malaise, nasal congestion with yellow d/c, bilat maxillary sinus pressure/pain, scratchy sore throat-PND. Pt denies any fever, chills, BA's, cough, SOB, wheezing, CP, N/V/D/abd pain associated. Pt reports taking OTC Allegra as directed but symptoms persisting. Pt's HR noted to be 108 in office. Pt reports that it has been elevated at times since developing sinus symptoms. Pt under care of [facility] for POTS-on Florinef 0.1 mg po daily and Toprol XL 25 mg po daily. Pt denies any palpitations, dizziness, syncope/near syncope, low BP (BP was /64 in office today). LMP-OCP's.     ASSESSMENT/PLAN:  Below is the assessment and plan developed based on review of pertinent history, physical exam, labs, studies, and medications.    1. Acute maxillary sinusitis, recurrence not specified  Pt who has a hx of anxiety, depression, Vitamin D deficiency, POTS, and allergic rhinitis reports starting 11/19/24 with malaise, nasal congestion with yellow d/c, bilat maxillary sinus pressure/pain, scratchy sore throat-PND. Pt denies any fever, chills, BA's, cough, SOB, wheezing, CP, N/V/D/abd pain associated. Pt reports taking OTC Allegra as directed but symptoms persisting.  Discussed with pt. Symptoms/physical exam findings c/w acute maxillary sinusitis. Will cover with abx. Checked allergies. Will cover with Doxy 100 mg po bid for 7 days and treat other symptoms as below. Pt can f/u PRN for acute maxillary sinusitis. Pt agrees to call in a few days if no better or sooner for concerns/new or worsening symptoms. Pt agrees to go to ER for severe symptoms as discussed.   -

## 2024-12-20 ENCOUNTER — TELEPHONE (OUTPATIENT)
Age: 19
End: 2024-12-20

## 2024-12-23 NOTE — TELEPHONE ENCOUNTER
Viviana Zhou routed conversation to [facility] [id number] minutes ago (8:12 AM)     Ruth REYES [facility] [place]  Staff3 days ago     OD  Please call [phone]

## 2024-12-23 NOTE — TELEPHONE ENCOUNTER
Mother, Mary, states patient has been complaining of frequent high HR, as high as 150.   She states HR bounces up and down and does not stay 150 for very long.   Increased POTS symptoms, including increased fatigue. Sleeping a lot.       Mary asks for appointment with Dr. Nguyen, but declined 12/24/24 and 12/27/24 appointments with Dr. Nguyen.     Scheduled 1/8/25 2:45 pm appointment with Dr. Nguyen at Mary' request. Advised Mary to take patient to [facility] ER for any HR of 130 or higher for 1 hour or more or if symptoms increase. Advised Mary to call with any further questions or concerns. Mary verbalized understanding.

## 2024-12-26 DIAGNOSIS — R09.82 POST-NASAL DRIP: ICD-10-CM

## 2024-12-26 DIAGNOSIS — J34.89 SINUS PRESSURE: ICD-10-CM

## 2024-12-26 DIAGNOSIS — R09.81 NASAL CONGESTION: ICD-10-CM

## 2024-12-27 RX ORDER — FLUTICASONE PROPIONATE 50 MCG
SPRAY, SUSPENSION (ML) NASAL
Refills: 0 | OUTPATIENT
Start: 2024-12-27

## 2025-01-13 ENCOUNTER — OFFICE VISIT (OUTPATIENT)
Dept: FAMILY MEDICINE CLINIC | Facility: CLINIC | Age: 20
End: 2025-01-13
Payer: COMMERCIAL

## 2025-01-13 VITALS
TEMPERATURE: 98.5 F | BODY MASS INDEX: 19.97 KG/M2 | OXYGEN SATURATION: 94 % | HEART RATE: 106 BPM | DIASTOLIC BLOOD PRESSURE: 62 MMHG | SYSTOLIC BLOOD PRESSURE: 84 MMHG | WEIGHT: 120 LBS

## 2025-01-13 DIAGNOSIS — R05.1 ACUTE COUGH: Primary | ICD-10-CM

## 2025-01-13 DIAGNOSIS — R09.89 CHEST CONGESTION: ICD-10-CM

## 2025-01-13 DIAGNOSIS — J20.8 ACUTE BACTERIAL BRONCHITIS: ICD-10-CM

## 2025-01-13 DIAGNOSIS — R68.89 FLU-LIKE SYMPTOMS: ICD-10-CM

## 2025-01-13 DIAGNOSIS — B96.89 ACUTE BACTERIAL BRONCHITIS: ICD-10-CM

## 2025-01-13 LAB
EXP DATE SOLUTION: NORMAL
EXP DATE SWAB: NORMAL
EXPIRATION DATE: NORMAL
LOT NUMBER POC: NORMAL
LOT NUMBER SOLUTION: NORMAL
LOT NUMBER SWAB: NORMAL
QUICKVUE INFLUENZA TEST: NORMAL
SARS-COV-2 RNA, POC: NEGATIVE
VALID INTERNAL CONTROL, POC: NORMAL

## 2025-01-13 PROCEDURE — 99213 OFFICE O/P EST LOW 20 MIN: CPT | Performed by: FAMILY MEDICINE

## 2025-01-13 PROCEDURE — 87635 SARS-COV-2 COVID-19 AMP PRB: CPT | Performed by: FAMILY MEDICINE

## 2025-01-13 PROCEDURE — 87804 INFLUENZA ASSAY W/OPTIC: CPT | Performed by: FAMILY MEDICINE

## 2025-01-13 RX ORDER — AZITHROMYCIN 250 MG/1
TABLET, FILM COATED ORAL
Qty: 6 TABLET | Refills: 0 | Status: SHIPPED | OUTPATIENT
Start: 2025-01-13

## 2025-01-13 SDOH — ECONOMIC STABILITY: FOOD INSECURITY: WITHIN THE PAST 12 MONTHS, YOU WORRIED THAT YOUR FOOD WOULD RUN OUT BEFORE YOU GOT MONEY TO BUY MORE.: NEVER TRUE

## 2025-01-13 SDOH — ECONOMIC STABILITY: FOOD INSECURITY: WITHIN THE PAST 12 MONTHS, THE FOOD YOU BOUGHT JUST DIDN'T LAST AND YOU DIDN'T HAVE MONEY TO GET MORE.: NEVER TRUE

## 2025-01-13 ASSESSMENT — PATIENT HEALTH QUESTIONNAIRE - PHQ9
2. FEELING DOWN, DEPRESSED OR HOPELESS: NOT AT ALL
SUM OF ALL RESPONSES TO PHQ9 QUESTIONS 1 & 2: 0
SUM OF ALL RESPONSES TO PHQ QUESTIONS 1-9: 0
SUM OF ALL RESPONSES TO PHQ QUESTIONS 1-9: 0
1. LITTLE INTEREST OR PLEASURE IN DOING THINGS: NOT AT ALL
SUM OF ALL RESPONSES TO PHQ QUESTIONS 1-9: 0
SUM OF ALL RESPONSES TO PHQ QUESTIONS 1-9: 0

## 2025-01-13 NOTE — PROGRESS NOTES
SUBJECTIVE:   Ruth Grissom is a 19 y.o. female who has a PMH sig for anxious depression followed by psychiatry, allergic rhinitis, vitamin D deficiency, POTS syndrome followed by cardiology and scoliosis.  Patient presents today complaining of a 3-day history of productive cough, congestion in her chest and sore throat.  She has had some bodyaches and low-grade fever as well.  Recently came back from a trip to [place].  No chest pain or shortness of breath reported.  No wheezing.    HPI  See above    Past Medical History, Past Surgical History, Family history, Social History, and Medications were all reviewed with the patient today and updated as necessary.       Current Outpatient Medications   Medication Sig Dispense Refill    SODIUM FLUORIDE 5000 PPM 1.1 % PSTE USE A PEA SIZED AMOUNT AND BRUSH ON THE TEETH AS YOU WOULD NORMAL TOOTHPASTE. EXPECTORATE AND DO NOT RINSE. USE MORNING AND NIGHT      fludrocortisone (FLORINEF) 0.1 MG tablet TAKE 1 TABLET BY MOUTH DAILY 90 tablet 3    FLUoxetine (PROZAC) 10 MG capsule TAKE 1 CAPSULE BY MOUTH WITH ONE 40MG CAPSULE FOR A TOTAL OF 50MG DAILY      FLUoxetine (PROZAC) 40 MG capsule Take 50 mg by mouth daily      vitamin B-12 (CYANOCOBALAMIN) 1000 MCG tablet Take 1 tablet by mouth daily      albuterol sulfate HFA (PROVENTIL;VENTOLIN;PROAIR) 108 (90 Base) MCG/ACT inhaler Inhale 2 puffs into the lungs every 6 hours as needed for Wheezing      buPROPion (WELLBUTRIN XL) 150 MG extended release tablet Take 1 tablet by mouth daily      norethindrone-ethinyl estradiol (JUNEL FE 1/20) 1-20 MG-MCG per tablet Take 1 tablet by mouth daily      fluticasone (FLONASE) 50 MCG/ACT nasal spray 2 sprays by Each Nostril route daily for 14 days (Patient not taking: Reported on 1/13/2025) 1 each 0     No current facility-administered medications for this visit.     Allergies   Allergen Reactions    Latex Other (See Comments)    Gramineae Pollens Hives    Molds & Smuts Other (See Comments)

## 2025-01-17 DIAGNOSIS — Z00.00 ROUTINE GENERAL MEDICAL EXAMINATION AT HEALTH CARE FACILITY: Primary | ICD-10-CM

## 2025-01-17 DIAGNOSIS — Z11.59 NEED FOR HEPATITIS C SCREENING TEST: ICD-10-CM

## 2025-01-17 DIAGNOSIS — Z11.4 ENCOUNTER FOR SCREENING FOR HIV: ICD-10-CM

## 2025-02-18 ENCOUNTER — TELEMEDICINE (OUTPATIENT)
Dept: FAMILY MEDICINE CLINIC | Facility: CLINIC | Age: 20
End: 2025-02-18
Payer: COMMERCIAL

## 2025-02-18 DIAGNOSIS — K52.9 ACUTE GASTROENTERITIS: ICD-10-CM

## 2025-02-18 DIAGNOSIS — R10.9 ABDOMINAL CRAMPS: Primary | ICD-10-CM

## 2025-02-18 PROCEDURE — 99213 OFFICE O/P EST LOW 20 MIN: CPT | Performed by: FAMILY MEDICINE

## 2025-02-18 RX ORDER — HYOSCYAMINE SULFATE 0.12 MG/1
0.12 TABLET ORAL EVERY 6 HOURS PRN
Qty: 30 TABLET | Refills: 0 | Status: SHIPPED | OUTPATIENT
Start: 2025-02-18

## 2025-02-18 NOTE — PROGRESS NOTES
SUBJECTIVE:   Ruth Grissom is a 19 y.o. female who has a PMH sig for anxious depression followed by psychiatry, allergic rhinitis, vitamin D deficiency, POTS syndrome followed by cardiology and scoliosis.  Patient presents today for a virtual visit through [employer] video messaging.  Patient is assisted by her mother.  Apparently over the weekend the patient developed nausea, vomiting, diarrhea and abdominal cramps.  Body aches were reported.  No fever.  No melena or hematochezia.  Patient's brother has had similar symptoms but recovered after a couple of days of symptoms.  Patient reports that her nausea and diarrhea are much better today but her abdominal cramps are still coming and going in waves.  That seems to be the predominant symptom at this time.  Patient is currently without active pain.    Patient's vital signs were not performed as encounter was performed virtually.  Location of virtual visit was patient's home.      HPI  See above    Past Medical History, Past Surgical History, Family history, Social History, and Medications were all reviewed with the patient today and updated as necessary.       Current Outpatient Medications   Medication Sig Dispense Refill    SODIUM FLUORIDE 5000 PPM 1.1 % PSTE USE A PEA SIZED AMOUNT AND BRUSH ON THE TEETH AS YOU WOULD NORMAL TOOTHPASTE. EXPECTORATE AND DO NOT RINSE. USE MORNING AND NIGHT      FLUoxetine (PROZAC) 10 MG capsule TAKE 1 CAPSULE BY MOUTH WITH ONE 40MG CAPSULE FOR A TOTAL OF 50MG DAILY      FLUoxetine (PROZAC) 40 MG capsule Take 50 mg by mouth daily      vitamin B-12 (CYANOCOBALAMIN) 1000 MCG tablet Take 1 tablet by mouth daily      albuterol sulfate HFA (PROVENTIL;VENTOLIN;PROAIR) 108 (90 Base) MCG/ACT inhaler Inhale 2 puffs into the lungs every 6 hours as needed for Wheezing      buPROPion (WELLBUTRIN XL) 150 MG extended release tablet Take 1 tablet by mouth daily      norethindrone-ethinyl estradiol (JUNEL FE 1/20) 1-20 MG-MCG per tablet Take 1 tablet